# Patient Record
Sex: FEMALE | URBAN - METROPOLITAN AREA
[De-identification: names, ages, dates, MRNs, and addresses within clinical notes are randomized per-mention and may not be internally consistent; named-entity substitution may affect disease eponyms.]

---

## 2019-08-22 ENCOUNTER — APPOINTMENT (OUTPATIENT)
Age: 64
Setting detail: DERMATOLOGY
End: 2019-08-23

## 2019-08-22 DIAGNOSIS — L29.8 OTHER PRURITUS: ICD-10-CM

## 2019-08-22 DIAGNOSIS — B09 UNSPECIFIED VIRAL INFECTION CHARACTERIZED BY SKIN AND MUCOUS MEMBRANE LESIONS: ICD-10-CM

## 2019-08-22 PROCEDURE — 99203 OFFICE O/P NEW LOW 30 MIN: CPT | Mod: 25

## 2019-08-22 PROCEDURE — OTHER INTRAMUSCULAR KENALOG: OTHER

## 2019-08-22 PROCEDURE — OTHER PRESCRIPTION: OTHER

## 2019-08-22 PROCEDURE — 96372 THER/PROPH/DIAG INJ SC/IM: CPT

## 2019-08-22 RX ORDER — TRIAMCINOLONE ACETONIDE 1 MG/G
CREAM TOPICAL
Qty: 1 | Refills: 2 | Status: ERX | COMMUNITY
Start: 2019-08-22

## 2019-08-22 RX ORDER — HYDROXYZINE HYDROCHLORIDE 10 MG/1
TABLET, FILM COATED ORAL
Qty: 40 | Refills: 0 | Status: ERX | COMMUNITY
Start: 2019-08-22

## 2019-08-22 ASSESSMENT — LOCATION ZONE DERM: LOCATION ZONE: ARM

## 2019-08-22 ASSESSMENT — LOCATION DETAILED DESCRIPTION DERM: LOCATION DETAILED: LEFT ANTERIOR SHOULDER

## 2019-08-22 ASSESSMENT — LOCATION SIMPLE DESCRIPTION DERM: LOCATION SIMPLE: LEFT SHOULDER

## 2019-08-22 NOTE — PROCEDURE: INTRAMUSCULAR KENALOG
Total Volume (Ccs): 1
Add Option For Additional Mediation: No
Concentration (Mg/Ml) Of Additional Medication: 2.5
Consent: The risks of atrophy were reviewed with the patient.
Kenalog Preparation: kenalog
Concentration (Mg/Ml): 40.0
Detail Level: None

## 2019-08-22 NOTE — HPI: RASH
What Type Of Note Output Would You Prefer (Optional)?: Standard Output
How Severe Is Your Rash?: moderate
Is This A New Presentation, Or A Follow-Up?: Rash
Additional History: Notes her daughter had a similar rash one to weeks ago
patient reaction

## 2022-06-21 ENCOUNTER — APPOINTMENT (OUTPATIENT)
Dept: URBAN - METROPOLITAN AREA CLINIC 272 | Age: 67
Setting detail: DERMATOLOGY
End: 2022-06-21

## 2022-06-21 DIAGNOSIS — L29.8 OTHER PRURITUS: ICD-10-CM

## 2022-06-21 DIAGNOSIS — L72.0 EPIDERMAL CYST: ICD-10-CM

## 2022-06-21 PROCEDURE — OTHER ADDITIONAL NOTES: OTHER

## 2022-06-21 PROCEDURE — OTHER ORDER TESTS: OTHER

## 2022-06-21 PROCEDURE — 36415 COLL VENOUS BLD VENIPUNCTURE: CPT

## 2022-06-21 PROCEDURE — OTHER PRESCRIPTION: OTHER

## 2022-06-21 PROCEDURE — 99204 OFFICE O/P NEW MOD 45 MIN: CPT

## 2022-06-21 PROCEDURE — OTHER VENIPUNCTURE: OTHER

## 2022-06-21 PROCEDURE — OTHER MIPS QUALITY: OTHER

## 2022-06-21 PROCEDURE — OTHER PRESCRIPTION MEDICATION MANAGEMENT: OTHER

## 2022-06-21 PROCEDURE — OTHER COUNSELING: OTHER

## 2022-06-21 RX ORDER — TRIAMCINOLONE ACETONIDE 1 MG/G
CREAM TOPICAL BID
Qty: 454 | Refills: 2 | Status: ERX | COMMUNITY
Start: 2022-06-21

## 2022-06-21 RX ORDER — PERMETHRIN 50 MG/G
CREAM TOPICAL
Qty: 120 | Refills: 1 | Status: ERX | COMMUNITY
Start: 2022-06-21

## 2022-06-21 ASSESSMENT — LOCATION DETAILED DESCRIPTION DERM
LOCATION DETAILED: LEFT ANTECUBITAL SKIN
LOCATION DETAILED: LEFT MEDIAL SUPERIOR CHEST

## 2022-06-21 ASSESSMENT — LOCATION ZONE DERM
LOCATION ZONE: ARM
LOCATION ZONE: TRUNK

## 2022-06-21 ASSESSMENT — LOCATION SIMPLE DESCRIPTION DERM
LOCATION SIMPLE: LEFT UPPER ARM
LOCATION SIMPLE: CHEST

## 2022-06-21 NOTE — PROCEDURE: PRESCRIPTION MEDICATION MANAGEMENT
Detail Level: Zone
Plan: Patient denies any fever,chills,night sweats,weight loss, notice in skin color or texture. Explained to patient that symptoms appear that of a change in skin color as yellow,checked in mouth and under tongue also yellow. Tried to explain to the patient that clinically there were no signs of anything biting her skin and that based on her appearance the itching she is experiencing is most likely coming from the inside out. The patient asked to look at “something she picked off her skin” it was on a sticky trap and appeared as a piece of lint, prescribed permethrin for patient to exclude any parasites for patient’s peace of mind ,Triamcinolone bid for itching.blood work checked today
Render In Strict Bullet Format?: No
Initiate Treatment: Premetherin,Triamcinolone

## 2022-06-21 NOTE — PROCEDURE: VENIPUNCTURE
Bill 38957 For Specimen Handling/Conveyance To Laboratory?: no
Venipuncture Paragraph: An alcohol pad was applied to the venipuncture site. Venipuncture was performed using a butterfly needle. Pressure and a bandaid was applied to the site. No complications were noted.
Detail Level: None
Number Of Tubes Drawn: 3

## 2022-06-21 NOTE — HPI: RASH
What Type Of Note Output Would You Prefer (Optional)?: Bullet Format
Is The Patient Presenting As Previously Scheduled?: Yes
How Severe Is Your Rash?: moderate
Is This A New Presentation, Or A Follow-Up?: Rash
Additional History: [place] the beach and stayed in a hotel approximately two weeks ago symptoms began after returning home

## 2022-06-21 NOTE — PROCEDURE: ADDITIONAL NOTES
Render Risk Assessment In Note?: no
Detail Level: Simple
Additional Notes: Patient requests permethrin, discussed that given her jaundice I suspect the itching is likely secondary to an underlying condition. Will go ahead and prescribe permethrin as patient did agree to do labs today